# Patient Record
Sex: MALE | Race: WHITE
[De-identification: names, ages, dates, MRNs, and addresses within clinical notes are randomized per-mention and may not be internally consistent; named-entity substitution may affect disease eponyms.]

---

## 2019-07-16 ENCOUNTER — HOSPITAL ENCOUNTER (EMERGENCY)
Dept: HOSPITAL 39 - ER | Age: 29
Discharge: HOME | End: 2019-07-16
Payer: OTHER GOVERNMENT

## 2019-07-16 VITALS — SYSTOLIC BLOOD PRESSURE: 130 MMHG | OXYGEN SATURATION: 98 % | DIASTOLIC BLOOD PRESSURE: 85 MMHG

## 2019-07-16 VITALS — TEMPERATURE: 97.3 F

## 2019-07-16 DIAGNOSIS — M10.9: Primary | ICD-10-CM

## 2019-07-16 NOTE — ED.PDOC
History of Present Illness





- General


Chief Complaint: General


Stated Complaint: L ankle swelling and discomfort


Time Seen by Provider: 07/16/19 08:55


Source: patient


Exam Limitations: no limitations





- History of Present Illness


Initial Comments: 





PT PRESENTS TO THE ED WITH PERSISTENT L ANKLE PAIN SECONDARY TO GOUT.  PT STATES

THAT HE WAS SEEN AT A CLINIC LAST WEEK AND STARTED ON COLCHICINE.  PT STATES 

THAT MEDICATION HAS NOT IMPROVED HIS SYMPTOMS.  HE STATES THAT STEROIDS HAVE 

WORKED WHEN PRESCRIBED IN THE PAST.  


Occurred: last week


Pain - Lower Extremity: moderate: Left Ankle


Improving Factors: immobilization


Worsening Factors: movement


Allergies/Adverse Reactions: 


Allergies





NO KNOWN ALLERGY Allergy (Verified 07/16/19 08:41)


   





Home Medications: 


Ambulatory Orders





Acetaminophen W/ Codeine [Tylenol W/ CODEINE #3] 1 ea PO Q4HR PRN #24  07/16/19 


Allopurinol [Zyloprim] 100 mg PO BID 07/16/19 


Indomethacin 100 mg PO DAILY 07/16/19 


Methylprednisolone [Medrol Dose Artur] 4 mg PO DAILY 6 Days #21 tab 07/16/19 











Review of Systems





- Review of Systems


Constitutional: Denies: chills, fever


Cardiology: Denies: edema, palpitations


Musculoskeletal: States: joint pain.  Denies: muscle pain


Skin: Denies: dryness, lesions





Past Medical History (General)





- Patient Medical History


Hx Stroke: No


Hx Congestive Heart Failure: No


Hx Diabetes: No





- Vaccination History


Hx Influenza Vaccination: Yes - 2018


Hx Pneumococcal Vaccination: No





- Social History


Hx Tobacco Use: No


Hx Alcohol Use: Yes - Infrequently





Family Medical History





- Family History


  ** Father


Family History: No Known


Living Status: Still Living





Physical Exam





- Physical Exam


General Appearance: Alert, Comfortable, No apparent distress, Well Developed, 

Well Groomed, Well Hydrated, Well Nourished


Eyes, Ears, Nose, Throat: normal ENT inspection


Cardiovascular/Respiratory: no respiratory distress


Thigh/Hip: normal inspection, non-tender


Leg: normal inspection, non-tender


Knee: normal inspection, non-tender


Ankle: normal inspection, non-tender, normal ROM


Foot: normal inspection, non-tender


Mental Status: alert


Skin: normal color, warm/dry





Departure





- Departure


Clinical Impression: 


 Gout attack





Time of Disposition: 09:02


Disposition: Discharge to Home or Self Care


Condition: Good


Departure Forms:  ED Discharge - Pt. Copy, Patient Portal Self Enrollment


Instructions:  Gout (DC)


Referrals: 


[facility] [Provider Group] - 1-5 Days


Prescriptions: 


Acetaminophen W/ Codeine [Tylenol W/ CODEINE #3] 1 ea PO Q4HR PRN #24 


 PRN Reason: Pain


Methylprednisolone [Medrol Dose Artur] 4 mg PO DAILY 6 Days #21 tab


Home Medications: 


Ambulatory Orders





Acetaminophen W/ Codeine [Tylenol W/ CODEINE #3] 1 ea PO Q4HR PRN #24  07/16/19 


Allopurinol [Zyloprim] 100 mg PO BID 07/16/19 


Indomethacin 100 mg PO DAILY 07/16/19 


Methylprednisolone [Medrol Dose Artur] 4 mg PO DAILY 6 Days #21 tab 07/16/19